# Patient Record
Sex: FEMALE | HISPANIC OR LATINO | Employment: UNEMPLOYED | URBAN - METROPOLITAN AREA
[De-identification: names, ages, dates, MRNs, and addresses within clinical notes are randomized per-mention and may not be internally consistent; named-entity substitution may affect disease eponyms.]

---

## 2017-05-06 ENCOUNTER — HOSPITAL ENCOUNTER (EMERGENCY)
Facility: MEDICAL CENTER | Age: 37
End: 2017-05-06
Attending: EMERGENCY MEDICINE
Payer: OTHER MISCELLANEOUS

## 2017-05-06 VITALS
RESPIRATION RATE: 14 BRPM | SYSTOLIC BLOOD PRESSURE: 111 MMHG | HEART RATE: 78 BPM | WEIGHT: 156.75 LBS | TEMPERATURE: 97.5 F | BODY MASS INDEX: 26.76 KG/M2 | DIASTOLIC BLOOD PRESSURE: 65 MMHG | HEIGHT: 64 IN | OXYGEN SATURATION: 97 %

## 2017-05-06 DIAGNOSIS — B02.9 HERPES ZOSTER WITHOUT COMPLICATION: ICD-10-CM

## 2017-05-06 PROCEDURE — A9270 NON-COVERED ITEM OR SERVICE: HCPCS | Performed by: EMERGENCY MEDICINE

## 2017-05-06 PROCEDURE — 99284 EMERGENCY DEPT VISIT MOD MDM: CPT

## 2017-05-06 PROCEDURE — 700102 HCHG RX REV CODE 250 W/ 637 OVERRIDE(OP): Performed by: EMERGENCY MEDICINE

## 2017-05-06 RX ORDER — VALACYCLOVIR HYDROCHLORIDE 500 MG/1
1000 TABLET, FILM COATED ORAL ONCE
Status: COMPLETED | OUTPATIENT
Start: 2017-05-06 | End: 2017-05-06

## 2017-05-06 RX ORDER — TRAMADOL HYDROCHLORIDE 50 MG/1
50 TABLET ORAL EVERY 4 HOURS PRN
Qty: 30 TAB | Refills: 0 | Status: SHIPPED | OUTPATIENT
Start: 2017-05-06

## 2017-05-06 RX ORDER — VALACYCLOVIR HYDROCHLORIDE 1 G/1
1000 TABLET, FILM COATED ORAL 3 TIMES DAILY
Qty: 21 TAB | Refills: 0 | Status: SHIPPED | OUTPATIENT
Start: 2017-05-06 | End: 2017-05-13

## 2017-05-06 RX ADMIN — VALACYCLOVIR 1000 MG: 500 TABLET, FILM COATED ORAL at 23:07

## 2017-05-06 ASSESSMENT — LIFESTYLE VARIABLES: DO YOU DRINK ALCOHOL: NO

## 2017-05-06 NOTE — ED AVS SNAPSHOT
5/6/2017    Chitra Higgins  [address] [place] [address]    Dear Chitra:    [facility] wants to ensure your discharge home is safe and you or your loved ones have had all of your questions answered regarding your care after you leave the hospital.    Below is a list of resources and contact information should you have any questions regarding your hospital stay, follow-up instructions, or active medical symptoms.    Questions or Concerns Regarding… Contact   Medical Questions Related to Your Discharge  (7 days a week, 8am-5pm) Contact a Nurse Care Coordinator   [phone]   Medical Questions Not Related to Your Discharge  (24 hours a day / 7 days a week)  Contact the Nurse Health Line   [phone]    Medications or Discharge Instructions Refer to your discharge packet   or contact your [facility] Primary Care Provider   [phone]   Follow-up Appointment(s) Schedule your appointment via [facility]   or contact Scheduling [phone]   Billing Review your statement via [facility]  or contact Billing [phone]   Medical Records Review your records via [facility]   or contact Medical Records [phone]     You may receive a telephone call within two days of discharge. This call is to make certain you understand your discharge instructions and have the opportunity to have any questions answered. You can also easily access your medical information, test results and upcoming appointments via the [facility] free online health management tool. You can learn more and sign up at [employer]. For assistance setting up your [facility] account, please call [phone].    Once again, we want to ensure your discharge home is safe and that you have a clear understanding of any next steps in your care. If you have any questions or concerns, please do not hesitate to contact us, we are here for you. Thank you for choosing [facility] for your healthcare needs.    Sincerely,    Your [facility] Healthcare Team

## 2017-05-06 NOTE — ED AVS SNAPSHOT
[facility] Together Access Code: [email or web address]  Expires: 6/5/2017 11:09 PM    Your email address is not on file at [facility].  Email Addresses are required for you to sign up for [facility], please contact [phone] to verify your personal information and to provide your email address prior to attempting to register for [facility].    Chitra Higgins  [address] [place] [address]    [facility] Together  A secure, online tool to manage your health information     [facility]’s [facility] Together® is a secure, online tool that connects you to your personalized health information from the privacy of your home -- day or night - making it very easy for you to manage your healthcare. Once the activation process is completed, you can even access your medical information using the [facility] Together pawan, which is available for free in the Apple Pawan store or Google Play store.     To learn more about [facility] Together, visit [email or web address]    There are two levels of access available (as shown below):   My Chart Features  [facility] Primary Care Doctor [facility]  Specialists [facility]  Urgent  Care Non-[facility] Primary Care Doctor   Email your healthcare team securely and privately 24/7 X X X    Manage appointments: schedule your next appointment; view details of past/upcoming appointments X      Request prescription refills. X      View recent personal medical records, including lab and immunizations X X X X   View health record, including health history, allergies, medications X X X X   Read reports about your outpatient visits, procedures, consult and ER notes X X X X   See your discharge summary, which is a recap of your hospital and/or ER visit that includes your diagnosis, lab results, and care plan X X  X     How to register for [employer]:  Once your e-mail address has been verified, follow the following steps to sign up for [employer].     1. Go to  [email or web address]  2. Click on the Sign Up Now box, which takes you to the New Member Sign Up page. You  will need to provide the following information:  a. Enter your [employer] Access Code exactly as it appears at the top of this page. (You will not need to use this code after you’ve completed the sign-up process. If you do not sign up before the expiration date, you must request a new code.)   b. Enter your date of birth.   c. Enter your home email address.   d. Click Submit, and follow the next screen’s instructions.  3. Create a Qluet ID. This will be your [employer]avegg login ID and cannot be changed, so think of one that is secure and easy to remember.  4. Create a Navegg password. You can change your password at any time.  5. Enter your Password Reset Question and Answer. This can be used at a later time if you forget your password.   6. Enter your e-mail address. This allows you to receive e-mail notifications when new information is available in [employer]gg.  7. Click Sign Up. You can now view your health information.    For assistance activating your [employer] account, call [phone]

## 2017-05-06 NOTE — ED AVS SNAPSHOT
Home Care Instructions                                                                                                                Chitra Higgins   MRN: [id number]    Department:  [facility], Emergency Dept   Date of Visit:  5/6/2017            [facility], Emergency Dept    [address] [place] [id number]    Phone:  [phone]      You were seen by     Dedra Mari M.D.      Your Diagnosis Was     Herpes zoster without complication     B02.9       These are the medications you received during your hospitalization from 05/06/2017 2019 to 05/06/2017 2309     Date/Time Order Dose Route Action    05/06/2017 2307 valacyclovir (VALTREX) caplet 1,000 mg 1,000 mg Oral Given      Follow-up Information     1. Follow up with [facility], Emergency Dept.    Specialty:  Emergency Medicine    Why:  Return for worsening pain, redness, rash or other concerns    Contact information    [address] [place] [address]  [place] [address] [phone]      Medication Information     Review all of your home medications and newly ordered medications with your primary doctor and/or pharmacist as soon as possible. Follow medication instructions as directed by your doctor and/or pharmacist.     Please keep your complete medication list with you and share with your physician. Update the information when medications are discontinued, doses are changed, or new medications (including over-the-counter products) are added; and carry medication information at all times in the event of emergency situations.               Medication List      START taking these medications        Instructions    Morning Afternoon Evening Bedtime    tramadol 50 MG Tabs   Commonly known as:  ULTRAM        Take 1 Tab by mouth every four hours as needed.   Dose:  50 mg                        valacyclovir 1 GM Tabs   Last time this was given:  1,000 mg on 5/6/2017 11:07 PM   Commonly known as:  VALTREX           Take 1 Tab by mouth 3 times a day for 7 days.   Dose:  1000 mg                             Where to Get Your Medications      You can get these medications from any pharmacy     Bring a paper prescription for each of these medications    - tramadol 50 MG Tabs  - valacyclovir 1 GM Tabs              Discharge Instructions       Culebrilla  (Shingles)  La culebrilla es mayank infección que causa mayank erupción dolorosa en la piel y ampollas llenas de líquido. La culebrilla está causada por el mismo virus que causa la varicela.  Solo se manifiesta en personas que:  · Tuvieron varicela.  · Recibieron la vacuna contra la varicela. ([place] es poco frecuente).  Los primeros síntomas de la culebrilla pueden ser picazón, hormigueo o dolor en mayank maria g de la piel. Luego de unos días o semanas aparecerá mayank erupción cutánea. La erupción suele presentarse en un solo lado del cuerpo en un patrón con forma de garth o de toth. Con el tiempo, la erupción se convierte en ampollas llenas de líquido que se abren, claire costras y se secan. Los medicamentos pueden tener los siguientes efectos:  · Ayudar a controlar el dolor.  · Lograr mayank recuperación más rápida.  · Prevenir problemas a ryan plazo.  CUIDADOS EN EL HOGAR  Medicamentos   · [place] los medicamentos solamente char se lo haya indicado el médico.  · Aplique mayank crema para calmar la picazón o con anestesia en la maria g afectada, char se lo haya indicado el médico.  Cuidado de la erupción y las ampollas   · [place] un baño de agua fría o aplique compresas frías en la maria g de la erupción o las ampollas char se lo haya indicado el médico. [place] aliviará el dolor y la picazón.  · Mantenga la maria g de la erupción cubierta con mayank venda (vendaje). Use ropas sueltas.  · Mantenga limpia la maria g de la erupción y las ampollas con jabón suave y agua fría, o char se lo haya indicado el médico.  · Controle la erupción todos los días para detectar signos de infección. Estos signos incluyen  enrojecimiento, hinchazón o dolor que perduran o empeoran.  · No pellizque las ampollas.  · No se rasque la maria g de la erupción.  Instrucciones generales   · Virgen reposo charbel char le indicó el médico.  · Concurra a todas las visitas de control char se lo haya indicado el médico. [place] es importante.  · Hasta tanto las ampollas formen costras, la infección puede causar varicela en las personas que nunca la tuvieron o no se vacunaron contra la varicela. Para impedir que esto sucede, evite tocar a otras personas o estar cerca de otras personas, en especial:  ¨ Bebés.  ¨ Embarazadas.  ¨ Niños que tienen eccema.  ¨ Personas mayores que presley recibido un trasplante.  ¨ Personas con enfermedades crónicas, char leucemia y sida.  SOLICITE AYUDA SI:  · El dolor no mejora con medicamentos.  · El dolor no mejora después de que la erupción desaparece.  · La erupción parece infectada. Los signos de infección son los siguiente:  ¨ Enrojecimiento.  ¨ Hinchazón.  ¨ Dolor que perdura o empeora.  SOLICITE AYUDA DE INMEDIATO SI:  · La erupción aparece en el thelma o la nariz.  · Tiene dolor en el thelma, en la maria g de los ojos o pérdida de la sensibilidad en un lado del thelma.  · Siente dolor o un zumbido en el oído.  · Tiene pérdida del gusto.  · La afección empeora.     Esta información no tiene char fin reemplazar el consejo del médico. Asegúrese de hacerle al médico cualquier pregunta que tenga.     Document Released: 03/16/2010 Document Revised: 01/08/2016  Elsevier Interactive Patient Education ©2016 Elsevier Inc.            Patient Information     Patient Information    Following emergency treatment: all patient requiring follow-up care must return either to a private physician or a clinic if your condition worsens before you are able to obtain further medical attention, please return to the emergency room.     Billing Information    At [facility], we work to make the billing process streamlined for our patients.  Our  Representatives are here to answer any questions you may have regarding your hospital bill.  If you have insurance coverage and have supplied your insurance information to us, we will submit a claim to your insurer on your behalf.  Should you have any questions regarding your bill, we can be reached online or by phone as follows:  Online: You are able pay your bills online or live chat with our representatives about any billing questions you may have. We are here to help Monday - Friday from 8:00am to 7:30pm and 9:00am - 12:00pm on Saturdays.  Please visit [email or web address]  for more information.   Phone:  [phone] or [phone]    Please note that your emergency physician, surgeon, pathologist, radiologist, anesthesiologist, and other specialists are not employed by [facility] and will therefore bill separately for their services.  Please contact them directly for any questions concerning their bills at the numbers below:     Emergency Physician Services:  [phone]  [facility]:  [phone]  [facility]:  [phone]  [facility]:  [phone]    1. Your final bill may vary from the amount quoted upon discharge if all procedures are not complete at that time, or if your doctor has additional procedures of which we are not aware. You will receive an additional bill if you return to the Emergency Department at [facility] for suture removal regardless of the facility of which the sutures were placed.     2. Please arrange for settlement of this account at the emergency registration.    3. All self-pay accounts are due in full at the time of treatment.  If you are unable to meet this obligation then payment is expected within 4-5 days.     4. If you have had radiology studies (CT, X-ray, Ultrasound, MRI), you have received a preliminary result during your emergency department visit. Please contact the radiology  department [phone] to receive a copy of your final result. Please discuss the Final result with your primary physician or with the follow up physician provided.     Crisis Hotline:  [place] Crisis Hotline:  0-625-QIBKMKS or 1-696.527.6925  [place] Crisis Hotline:    1-342.928.9208 or 739-308-2814         ED Discharge Follow Up Questions    1. In order to provide you with very good care, we would like to follow up with a phone call in the next few days.  May we have your permission to contact you?     YES /  NO    2. What is the best phone number to call you? (       )_____-__________    3. What is the best time to call you?      Morning  /  Afternoon  /  Evening                   Patient Signature:  ____________________________________________________________    Date:  ____________________________________________________________

## 2017-05-07 NOTE — ED PROVIDER NOTES
"      ED Provider Note    Scribed for Dedra Mari M.D. by Sandra Don. 5/6/2017, 10:23 PM.    Means of arrival: walk-in  History obtained from: patient  History limited by: none    CHIEF COMPLAINT  Chief Complaint   Patient presents with   • Rash     Since monday. Right lower back and right abd.        HPI  Chitra Higgins is a 36 y.o. female who presents to the Emergency Department for a rash onset 6 days ago along her lower back and abdomen with associated itchiness and pain. Patient had chicken pox as a child. She states that some of her lesions are new today.  No complaints of fever.    REVIEW OF SYSTEMS  Pertinent positives include rash, itchiness, and pain along the rash. Pertinent negatives include no fever.      PAST MEDICAL HISTORY   has a past medical history of History of macrosomia in infant in prior pregnancy, currently pregnant (4/6/2010) and Heart burn.    SOCIAL HISTORY  Social History   Substance Use Topics   • Smoking status: Never Smoker    • Smokeless tobacco: Never Used   • Alcohol Use: No      History   Drug Use No       SURGICAL HISTORY   has past surgical history that includes elizabeth by laparoscopy (8/25/2013) and breast biopsy (10/23/2013).     CURRENT MEDICATIONS  Home Medications     **Home medications have not yet been reviewed for this encounter**          ALLERGIES  Allergies   Allergen Reactions   • Pcn [Penicillins] Rash and Itching       PHYSICAL EXAM  VITAL SIGNS: /71 mmHg  Pulse 101  Temp(Src) 36.4 °C (97.5 °F)  Resp 14  Ht 1.626 m (5' 4\")  Wt 71.1 kg (156 lb 12 oz)  BMI 26.89 kg/m2  SpO2 97%  LMP 05/02/2017    Constitutional: Alert in no apparent distress. Well appearing  HENT: Normocephalic, Atraumatic, Bilateral external ears normal. Nose normal.   Eyes:  Conjunctiva normal, non-icteric.   Lungs: Non-labored respirations  Skin: Warm, Dry, clumps of erythematous vesicular legions and dermatome disposition around right flank.  Neurologic: Alert, Grossly " non-focal.   Psychiatric: Affect normal, Judgment normal, Mood normal, Appears appropriate and not intoxicated.         COURSE & MEDICAL DECISION MAKING  Pertinent Labs & Imaging studies reviewed. (See chart for details)    10:23 PM - Patient seen and examined at bedside. I informed the patient that she is experiencing Shingles, which is a virus that will have to run its course. However given new lesions she'll be prescribed Valtrex. I advised Benadryl to help with the discomfort. She will also get some pain medication as this could be very painful.       The patient will return for new or worsening symptoms and is stable at the time of discharge. Patient was given return precautions. Patient and/or family member verbalizes understanding and will comply.    The patient will not drink alcohol nor drive with prescribed medications. The patient will return for new or worsening symptoms and is stable at the time of discharge. Patient was given return precautions. Patient and/or family member verbalizes understanding and will comply.    I have queried the patient in the prescription monitoring program, I do not see any evidence of prescription abuse.      DISPOSITION:  Patient will be discharged home in stable condition.    FOLLOW UP:  [facility], Emergency Dept  [facility] [facility] [address] [phone]    Return for worsening pain, redness, rash or other concerns      OUTPATIENT MEDICATIONS:  Discharge Medication List as of 5/6/2017 11:09 PM      START taking these medications    Details   valacyclovir (VALTREX) 1 GM Tab Take 1 Tab by mouth 3 times a day for 7 days., Disp-21 Tab, R-0, Print Rx Paper      tramadol (ULTRAM) 50 MG Tab Take 1 Tab by mouth every four hours as needed., Disp-30 Tab, R-0, Print Rx Paper               FINAL IMPRESSION  1. Herpes zoster without complication         This dictation has been created using voice recognition software and/or scribes. The accuracy of  the dictation is limited by the abilities of the software and the expertise of the scribes. I expect there may be some errors of grammar and possibly content. I made every attempt to manually correct the errors within my dictation. However, errors related to voice recognition software and/or scribes may still exist and should be interpreted within the appropriate context.     ISandra (Scribe), am scribing for, and in the presence of, Dedra Mari M.D..    Electronically signed by: Sandra Don (Scribe), 5/6/2017    IDedra M.D. personally performed the services described in this documentation, as scribed by Sandra Don in my presence, and it is both accurate and complete.    The note accurately reflects work and decisions made by me.  Dedra Mari  5/7/2017  1:43 AM

## 2017-05-07 NOTE — ED NOTES
Chitra Higgins  36 y.o. female  Chief Complaint   Patient presents with   • Rash     Since monday. Right lower back and right abd.      Pt amb to triage with steady gait for above complaint. Rash appears to follow a dermatome.   Pt placed in lobby. Pt educated on triage process. Pt encouraged to alert staff for any changes.

## 2017-05-07 NOTE — ED NOTES
Pt given discharge paperwork. 2 new scripts given to pt. Discussed with patient and all questions answered. Pt to lobby with all personal belongings.

## 2017-05-07 NOTE — DISCHARGE INSTRUCTIONS
Culebrilla  (Shingles)  La culebrilla es mayank infección que causa mayank erupción dolorosa en la piel y ampollas llenas de líquido. La culebrilla está causada por el mismo virus que causa la varicela.  Solo se manifiesta en personas que:  · Tuvieron varicela.  · Recibieron la vacuna contra la varicela. ([place] es poco frecuente).  Los primeros síntomas de la culebrilla pueden ser picazón, hormigueo o dolor en mayank maria g de la piel. Luego de unos días o semanas aparecerá mayank erupción cutánea. La erupción suele presentarse en un solo lado del cuerpo en un patrón con forma de garth o de toth. Con el tiempo, la erupción se convierte en ampollas llenas de líquido que se abren, claire costras y se secan. Los medicamentos pueden tener los siguientes efectos:  · Ayudar a controlar el dolor.  · Lograr mayank recuperación más rápida.  · Prevenir problemas a ryan plazo.  CUIDADOS EN EL HOGAR  Medicamentos   · [place] los medicamentos solamente char se lo haya indicado el médico.  · Aplique mayank crema para calmar la picazón o con anestesia en la maria g afectada, char se lo haya indicado el médico.  Cuidado de la erupción y las ampollas   · [place] un baño de agua fría o aplique compresas frías en la maria g de la erupción o las ampollas char se lo haya indicado el médico. [place] aliviará el dolor y la picazón.  · Mantenga la maria g de la erupción cubierta con mayank venda (vendaje). Use ropas sueltas.  · Mantenga limpia la maria g de la erupción y las ampollas con jabón suave y agua fría, o char se lo haya indicado el médico.  · Controle la erupción todos los días para detectar signos de infección. Estos signos incluyen enrojecimiento, hinchazón o dolor que perduran o empeoran.  · No pellizque las ampollas.  · No se rasque la maria g de la erupción.  Instrucciones generales   · Virgen reposo charble char le indicó el médico.  · Concurra a todas las visitas de control char se lo haya indicado el médico. [place] es importante.  · Hasta tanto las ampollas formen costras,  la infección puede causar varicela en las personas que nunca la tuvieron o no se vacunaron contra la varicela. Para impedir que esto sucede, evite tocar a otras personas o estar cerca de otras personas, en especial:  ¨ Bebés.  ¨ Embarazadas.  ¨ Niños que tienen eccema.  ¨ Personas mayores que presley recibido un trasplante.  ¨ Personas con enfermedades crónicas, char leucemia y sida.  SOLICITE AYUDA SI:  · El dolor no mejora con medicamentos.  · El dolor no mejora después de que la erupción desaparece.  · La erupción parece infectada. Los signos de infección son los siguiente:  ¨ Enrojecimiento.  ¨ Hinchazón.  ¨ Dolor que perdura o empeora.  SOLICITE AYUDA DE INMEDIATO SI:  · La erupción aparece en el thelma o la nariz.  · Tiene dolor en el thelma, en la maria g de los ojos o pérdida de la sensibilidad en un lado del thelma.  · Siente dolor o un zumbido en el oído.  · Tiene pérdida del gusto.  · La afección empeora.     Esta información no tiene char fin reemplazar el consejo del médico. Asegúrese de hacerle al médico cualquier pregunta que tenga.     Document Released: 03/16/2010 Document Revised: 01/08/2016  [employer] Interactive Patient Education ©2016 Elsevier Inc.

## 2017-05-14 ENCOUNTER — PATIENT OUTREACH (OUTPATIENT)
Dept: HEALTH INFORMATION MANAGEMENT | Facility: OTHER | Age: 37
End: 2017-05-14

## 2017-05-14 NOTE — PROGRESS NOTES
· 5/14/17 at 9:50 AM--Received phone call from patient s/p ER discharge on 5/6/17.  Pt is Citizen of [place]-speaking only, spoke with pt through  at  line.  Pt states she was pt at ER on 5/6/17 and diagnosed with shingles.  Patient states she has completed prescribed medication and she still has rash on back and lower abdomen, along with itchiness and pain.  Patient states she does not have a regular PCP.  Instructed patient to return to ER for worsening symptoms or contact [facility] clinic tomorrow 5/15/17 for f/u appt.  Pt verbalizes understanding and states that she is familiar with the location of the [facility] clinic and has their contact phone number.  Pt states that she will contact the [facility] clinic tomorrow 5/15/17 for f/u appt.

## 2018-04-14 ENCOUNTER — HOSPITAL ENCOUNTER (EMERGENCY)
Facility: MEDICAL CENTER | Age: 38
End: 2018-04-14
Attending: EMERGENCY MEDICINE

## 2018-04-14 VITALS
HEIGHT: 65 IN | OXYGEN SATURATION: 97 % | TEMPERATURE: 97.8 F | BODY MASS INDEX: 26.63 KG/M2 | RESPIRATION RATE: 16 BRPM | DIASTOLIC BLOOD PRESSURE: 74 MMHG | WEIGHT: 159.83 LBS | HEART RATE: 73 BPM | SYSTOLIC BLOOD PRESSURE: 108 MMHG

## 2018-04-14 DIAGNOSIS — N30.90 CYSTITIS: ICD-10-CM

## 2018-04-14 LAB
APPEARANCE UR: ABNORMAL
COLOR UR AUTO: YELLOW
GLUCOSE UR QL STRIP.AUTO: NEGATIVE MG/DL
HCG UR QL: NEGATIVE
KETONES UR QL STRIP.AUTO: NEGATIVE MG/DL
LEUKOCYTE ESTERASE UR QL STRIP.AUTO: ABNORMAL
NITRITE UR QL STRIP.AUTO: NEGATIVE
PH UR STRIP.AUTO: 5.5 [PH]
PROT UR QL STRIP: 100 MG/DL
RBC UR QL AUTO: ABNORMAL
SP GR UR: 1.02

## 2018-04-14 PROCEDURE — 99283 EMERGENCY DEPT VISIT LOW MDM: CPT

## 2018-04-14 PROCEDURE — 81002 URINALYSIS NONAUTO W/O SCOPE: CPT

## 2018-04-14 PROCEDURE — 81025 URINE PREGNANCY TEST: CPT

## 2018-04-14 RX ORDER — NITROFURANTOIN 25; 75 MG/1; MG/1
100 CAPSULE ORAL 2 TIMES DAILY
Qty: 14 CAP | Refills: 0 | Status: SHIPPED | OUTPATIENT
Start: 2018-04-14 | End: 2018-04-21

## 2018-04-14 ASSESSMENT — LIFESTYLE VARIABLES: DO YOU DRINK ALCOHOL: NO

## 2018-04-14 ASSESSMENT — PAIN SCALES - GENERAL: PAINLEVEL_OUTOF10: 2

## 2018-04-14 NOTE — ED NOTES
Pt ambulated to yellow 63.  Agree with triage note.  Urine sample collected and POC in progress.  ERP to see.

## 2018-04-14 NOTE — ED PROVIDER NOTES
"ED Provider Note    CHIEF COMPLAINT  Chief Complaint   Patient presents with   • Painful Urination     Pt states since yesterday having painful urination and increased frequency.        HPI  Chitra Higgins is a 37 y.o. female who presents to the emergency Department dysuria. Started yesterday. Moderate genital Burning sensation during straining. No pain otherwise. Denies abdominal pain, nausea or vomiting. No flank pain. No fevers or chills. No vaginal discharge or abnormal bleeding. Not pregnant.    REVIEW OF SYSTEMS  Pertinent negative: As above    PAST MEDICAL HISTORY  Past Medical History:   Diagnosis Date   • Heart burn    • History of macrosomia in infant in prior pregnancy, currently pregnant 4/6/2010       SOCIAL HISTORY  Social History   Substance Use Topics   • Smoking status: Never Smoker   • Smokeless tobacco: Never Used   • Alcohol use No       SURGICAL HISTORY  Past Surgical History:   Procedure Laterality Date   • BREAST BIOPSY  10/23/2013    Performed by Franki Dodd M.D. at [facility]   • KATHLEEN BY LAPAROSCOPY  8/25/2013    Performed by Franki Dodd M.D. at [facility]       ALLERGIES  Allergies   Allergen Reactions   • Pcn [Penicillins] Rash and Itching       PHYSICAL EXAM  VITAL SIGNS: /73   Pulse 75   Temp 36.6 °C (97.8 °F)   Resp 18   Ht 1.651 m (5' 5\")   Wt 72.5 kg (159 lb 13.3 oz)   LMP 10/14/2009   SpO2 98%   BMI 26.60 kg/m²    Constitutional: Awake and alert. Nontoxic  HENT:  Grossly normal  Eyes: Grossly normal  Neck: Normal range of motion  Cardiovascular: Normal heart rate   Thorax & Lungs: No respiratory distress  Extremities: Well perfused  Psychiatric: Affect normal  Results for orders placed or performed during the hospital encounter of 04/14/18   POC UA   Result Value Ref Range    POC Color Yellow     POC Appearance Slightly Cloudy (A)     POC Glucose Negative Negative mg/dL    POC Ketones Negative Negative mg/dL    POC " Specific Gravity 1.025 1.005 - 1.030    POC Blood Large (A) Negative    POC Urine PH 5.5 5.0 - 8.0    POC Protein 100 (A) Negative mg/dL    POC Nitrites Negative Negative    POC Leukocyte Esterase Moderate (A) Negative   POC URINE PREGNANCY   Result Value Ref Range    POC Urine Pregnancy Test Negative Negative         COURSE & MEDICAL DECISION MAKING  Patient presents with dysuria. Obtained urinalysis which shows evidence of UTI. Patient does not have suggestion of upper tract infection. She will be treated with Macrobid. Advised follow-up with primary doctor in 1 week. Return to ER for worsening and not improving or concern.    Patient referred to primary provider for blood pressure management    FINAL IMPRESSION  1. Cystitis with hematuria      Disposition: home in good condition      This dictation was created using voice recognition software. The accuracy of the dictation is limited to the abilities of the software.  The nursing notes were reviewed and certain aspects of this information were incorporated into this note.      Electronically signed by: Ben Bowling, 4/14/2018 9:52 AM

## 2018-04-14 NOTE — ED NOTES
Discharge instructions given.  All questions answered.  Prescriptions given x1.  Pt to follow-up with PCP.  Pt verbalized understanding.  All belongings with pt.  Pt ambulated to lobby.

## 2018-04-14 NOTE — ED TRIAGE NOTES
"Chief Complaint   Patient presents with   • Painful Urination     Pt states since yesterday having painful urination and increased frequency.      Pt provided with urine specimen cup.   /73   Pulse 75   Temp 36.6 °C (97.8 °F)   Resp 18   Ht 1.651 m (5' 5\")   Wt 72.5 kg (159 lb 13.3 oz)   LMP 10/14/2009   SpO2 98%   BMI 26.60 kg/m²   Pt placed back in lobby, educated on triage process, and told to inform staff of any change in condition.     "

## 2018-05-15 ENCOUNTER — HOSPITAL ENCOUNTER (EMERGENCY)
Facility: MEDICAL CENTER | Age: 38
End: 2018-05-16
Attending: EMERGENCY MEDICINE

## 2018-05-15 DIAGNOSIS — N39.0 ACUTE UTI: ICD-10-CM

## 2018-05-15 LAB
APPEARANCE UR: CLEAR
BACTERIA #/AREA URNS HPF: NEGATIVE /HPF
BILIRUB UR QL STRIP.AUTO: ABNORMAL
COLOR UR: ABNORMAL
EPI CELLS #/AREA URNS HPF: ABNORMAL /HPF
GLUCOSE UR STRIP.AUTO-MCNC: NEGATIVE MG/DL
HCG UR QL: NEGATIVE
HYALINE CASTS #/AREA URNS LPF: ABNORMAL /LPF
KETONES UR STRIP.AUTO-MCNC: NEGATIVE MG/DL
LEUKOCYTE ESTERASE UR QL STRIP.AUTO: ABNORMAL
MICRO URNS: ABNORMAL
NITRITE UR QL STRIP.AUTO: POSITIVE
PH UR STRIP.AUTO: 5.5 [PH]
PROT UR QL STRIP: NEGATIVE MG/DL
RBC # URNS HPF: ABNORMAL /HPF
RBC UR QL AUTO: ABNORMAL
SP GR UR REFRACTOMETRY: 1.03
SP GR UR STRIP.AUTO: 1.03
UROBILINOGEN UR STRIP.AUTO-MCNC: 1 MG/DL
WBC #/AREA URNS HPF: ABNORMAL /HPF

## 2018-05-15 PROCEDURE — 700102 HCHG RX REV CODE 250 W/ 637 OVERRIDE(OP): Performed by: EMERGENCY MEDICINE

## 2018-05-15 PROCEDURE — 99284 EMERGENCY DEPT VISIT MOD MDM: CPT

## 2018-05-15 PROCEDURE — 81001 URINALYSIS AUTO W/SCOPE: CPT

## 2018-05-15 PROCEDURE — 81025 URINE PREGNANCY TEST: CPT

## 2018-05-15 PROCEDURE — 87086 URINE CULTURE/COLONY COUNT: CPT

## 2018-05-15 PROCEDURE — A9270 NON-COVERED ITEM OR SERVICE: HCPCS | Performed by: EMERGENCY MEDICINE

## 2018-05-15 RX ORDER — ONDANSETRON 4 MG/1
4 TABLET, ORALLY DISINTEGRATING ORAL EVERY 8 HOURS PRN
Qty: 10 TAB | Refills: 0 | Status: SHIPPED | OUTPATIENT
Start: 2018-05-15

## 2018-05-15 RX ORDER — PHENAZOPYRIDINE HYDROCHLORIDE 200 MG/1
100 TABLET, FILM COATED ORAL 3 TIMES DAILY PRN
Qty: 6 TAB | Refills: 0 | Status: SHIPPED | OUTPATIENT
Start: 2018-05-15

## 2018-05-15 RX ORDER — NITROFURANTOIN 25; 75 MG/1; MG/1
100 CAPSULE ORAL 2 TIMES DAILY
Qty: 14 CAP | Refills: 0 | Status: SHIPPED | OUTPATIENT
Start: 2018-05-15

## 2018-05-15 RX ORDER — NITROFURANTOIN 25; 75 MG/1; MG/1
100 CAPSULE ORAL ONCE
Status: COMPLETED | OUTPATIENT
Start: 2018-05-15 | End: 2018-05-15

## 2018-05-15 RX ADMIN — NITROFURANTOIN (MONOHYDRATE/MACROCRYSTALS) 100 MG: 75; 25 CAPSULE ORAL at 23:41

## 2018-05-15 ASSESSMENT — PAIN SCALES - GENERAL
PAINLEVEL_OUTOF10: 8
PAINLEVEL_OUTOF10: 7

## 2018-05-16 VITALS
BODY MASS INDEX: 29.34 KG/M2 | HEIGHT: 63 IN | SYSTOLIC BLOOD PRESSURE: 131 MMHG | WEIGHT: 165.57 LBS | RESPIRATION RATE: 18 BRPM | OXYGEN SATURATION: 99 % | DIASTOLIC BLOOD PRESSURE: 76 MMHG | HEART RATE: 69 BPM | TEMPERATURE: 96.9 F

## 2018-05-16 NOTE — ED TRIAGE NOTES
"Chitra Wallerjamila Higgins  Chief Complaint   Patient presents with   • Painful Urination     since yesterday.    • Low Back Pain     Pt ambulatory to triage with above complaint. Pt states s/s started yesterday. Pt states she has been taking OTC Azo for urinary pain relief with no relief of s/s. Pt also report lower back pain that started today. Pt states she was seen 1 month ago here for UTI.    /64   Pulse 74   Temp 36.1 °C (96.9 °F) (Temporal)   Resp 16   Ht 1.6 m (5' 3\")   Wt 75.1 kg (165 lb 9.1 oz)   LMP 10/14/2009   SpO2 97%   BMI 29.33 kg/m²     Pt informed of triage process and encouraged to notify staff of any changes or concerns. Pt verbalized understanding of instructions. Apologized for long wait time. Pt placed back in lobby.     "

## 2018-05-16 NOTE — ED NOTES
Painful Urination        since yesterday.    • Low Back Pain      Pt ambulatory to triage with above complaint. Pt states s/s started yesterday. Pt states she has been taking OTC Azo for urinary pain relief with no relief of s/s. Pt also report lower back pain that started today. Pt states she was seen 1 month ago here for UTI.

## 2018-05-16 NOTE — DISCHARGE INSTRUCTIONS
Return to ER for   Follow up with urologist for frequent urine infections  Take Macrobid as directed twice daily for one week  Take Zofran as needed for nausea  Take pyridium as needed for painful urination  Obtain a regular doctor for recheck of urine after antibiotics are finished        Infección de las vías urinarias en los adultos  (Urinary Tract Infection, Adult)  Mayank infección urinaria (IU) es mayank infección en cualquier parte de las vías urinarias, que incluyen los riñones, los uréteres, la vejiga y la uretra. Estos órganos fabrican, almacenan y eliminan la orina del organismo. La IU puede ser mayank infección de la vejiga (cistitis) o infección de los riñones (pielonefritis).  CAUSAS  Esta infección puede ser causada por hongos, virus o bacterias. Las bacterias son las causas más comunes de las IU. Esta afección también puede ser provocada por no vaciar la vejiga por completo monica la micción en repetidas ocasiones.  FACTORES DE RIESGO  Es más probable que esta afección se manifieste si:  · Usted ignora la necesidad de orinar o retiene la orina monica largos períodos.  · No vacía la vejiga completamente monica la micción.  · Se limpia de atrás hacia adelante después de orinar o defecar, en el david de que sea raisa.  · Está circuncidado, en el david de que sea varón.  · Tiene estreñimiento.  · Tiene colocada mayank sonda urinaria permanente.  · Tiene debilitado el sistema de defensa (inmunitario) del cuerpo.  · Tiene mayank enfermedad que afecta los intestinos, los riñones o la vejiga.  · Tiene diabetes.  · Charito antibióticos con frecuencia o monica largos períodos, y los antibióticos ya no resultan eficaces para combatir algunos tipos de infecciones (resistencia a los antibióticos).  · Charito medicamentos que le irritan las vías urinarias.  · Está expuesto a sustancias químicas que le irritan las vías urinarias.  · Es raisa.  SÍNTOMAS  Los síntomas de esta afección incluyen lo siguiente:  · Fiebre.  · Micción  frecuente o eliminación de pequeñas cantidades de orina con frecuencia.  · Necesidad urgente de orinar.  · Ardor o dolor al orinar.  · Orina con mal olor u olor atípico.  · Orina turbia.  · Dolor en la parte baja del abdomen o en la espalda.  · Dificultad para orinar.  · Deni en la orina.  · Vómitos o más apetito de lo normal.  · Diarrea o dolor abdominal.  · Secreción vaginal, si es raisa.  DIAGNÓSTICO  Esta afección se diagnostica mediante peyton antecedentes médicos y un examen físico. También deberá proporcionar mayank muestra de orina para realizar análisis. Podrán indicarle otros estudios, por ejemplo:  · Análisis de deni.  · Pruebas de detección de enfermedades de transmisión sexual (ETS).  Si ha tenido más de mayank IU, se pueden hacer estudios de diagnóstico por imágenes o mayank citoscopia para determinar la causa de las infecciones.  TRATAMIENTO  El tratamiento de esta afección suele incluir mayank combinación de dos o más de los siguientes:  · Antibióticos.  · Otros medicamentos para tratar las causas menos frecuentes de infección urinaria.  · Medicamentos de venta león para aliviar el dolor.  · Consumo de la cantidad necesaria de agua para mantenerse hidratado.  INSTRUCCIONES PARA EL CUIDADO EN EL HOGAR  · [place] los medicamentos de venta león y los recetados solamente char se lo haya indicado el médico.  · Si le recetaron un antibiótico, tómelo char se lo haya indicado el médico. No deje de scot los antibióticos aunque comience a sentirse mejor.  · Evite el alcohol, la cafeína, el té y las bebidas gaseosas. Estas sustancias pueden irritar la vejiga.  · Charis suficiente líquido para mantener la orina krishan o de color amarillo pálido.  · Concurra a todas las visitas de control char se lo haya indicado el médico. [place] es importante.  · Asegúrese de lo siguiente:  ¨ Vaciar la vejiga con frecuencia y en alas totalidad. No contener la orina monica largos períodos.  ¨ Vaciar la vejiga antes y después de tener relaciones  sexuales.  ¨ Limpiar de adelante hacia atrás después de defecar, si es raisa. Usar cada trozo de papel mayank vez cuando se limpie.  SOLICITE ATENCIÓN MÉDICA SI:  · Siente dolor en la espalda.  · Tiene fiebre.  · Siente náuseas o vomita.  · Los síntomas no mejoran después de 3 días de tratamiento.  · Los síntomas desaparecen y luego reaparecen.  SOLICITE ATENCIÓN MÉDICA DE INMEDIATO SI:  · Siente dolor intenso en la espalda o en la maria g inferior del abdomen.  · Tiene vómitos y no puede tragar medicamentos ni agua.  Esta información no tiene char fin reemplazar el consejo del médico. Asegúrese de hacerle al médico cualquier pregunta que tenga.  Document Released: 09/27/2006 Document Revised: 04/10/2017 Document Reviewed: 11/07/2016  [employer] Interactive Patient Education © 2017 [employer] Inc[employer]

## 2018-05-16 NOTE — ED PROVIDER NOTES
"ED Provider Note    CHIEF COMPLAINT  Chief Complaint   Patient presents with   • Painful Urination     since yesterday.    • Low Back Pain       HPI  Chitra Higgins is a 37 y.o. female with recent and recurrent UTI who presents complaining of dysuria since yesterday.    Patient states she had similar symptoms last month.  She completed her antibiotics and was not rechecked on her urine to ensure clearance.  She was told by her clinic doctor prior to coming to the ER last time that she would need a urology referral as she has had recurrent UTIs for several years at this point.    Patient denies nausea, vomiting, flank pain, fever, chills, vaginal discharge      ALLERGIES  Allergies   Allergen Reactions   • Pcn [Penicillins] Rash and Itching       CURRENT MEDICATIONS  Denies    PAST MEDICAL HISTORY   has a past medical history of Heart burn and History of macrosomia in infant in prior pregnancy, currently pregnant (4/6/2010).    SURGICAL HISTORY   has a past surgical history that includes elizabeth by laparoscopy (8/25/2013) and breast biopsy (10/23/2013).    SOCIAL HISTORY  Social History     Social History Main Topics   • Smoking status: Never Smoker   • Smokeless tobacco: Never Used   • Alcohol use No   • Drug use: No   • Sexual activity: Not on file       Family Hx:  No family history of kidney stones      REVIEW OF SYSTEMS  See HPI for further details.  All other systems are negative except as above in HPI.      PHYSICAL EXAM  VITAL SIGNS: /64   Pulse 74   Temp 36.1 °C (96.9 °F) (Temporal)   Resp 16   Ht 1.6 m (5' 3\")   Wt 75.1 kg (165 lb 9.1 oz)   LMP 10/14/2009   SpO2 97%   BMI 29.33 kg/m²     General:  WDWN, nontoxic appearing in NAD; A+Ox3; V/S as above; afebrile  Skin: warm and dry; good color; no rash  HEENT: NCAT; EOMs intact; PERRL; no scleral icterus   Neck: FROM; soft, supple  Cardiovascular: Regular heart rate and rhythm.  No murmurs, rubs, or gallops; pulses 2+ bilaterally radially "   Lungs: Clear to auscultation with good air movement bilaterally.  No wheezes, rhonchi, or rales.   Abdomen: BS present; soft; NTND; no rebound, guarding, or rigidity.  No organomegaly or pulsatile mass; no CVAT   Extremities: DRUMMOND x 4; no e/o trauma; no pedal edema; neg Philip's  Neurologic: CNs III-XII grossly intact; speech clear; distal sensation intact; strength 5/5 UE/LEs  Psychiatric: Appropriate affect, normal mood    LABS  Results for orders placed or performed during the hospital encounter of 05/15/18   URINALYSIS CULTURE, IF INDICATED   Result Value Ref Range    Color DK Yellow     Character Clear     Specific Gravity 1.029 <1.035    Ph 5.5 5.0 - 8.0    Glucose Negative Negative mg/dL    Ketones Negative Negative mg/dL    Protein Negative Negative mg/dL    Bilirubin Small (A) Negative    Urobilinogen, Urine 1.0 Negative    Nitrite Positive (A) Negative    Leukocyte Esterase Small (A) Negative    Occult Blood Trace (A) Negative    Micro Urine Req Microscopic    URINE MICROSCOPIC (W/UA)   Result Value Ref Range    WBC 20-50 (A) /hpf    RBC 5-10 (A) /hpf    Bacteria Negative None /hpf    Epithelial Cells Few /hpf    Hyaline Cast 3-5 (A) /lpf       MEDICAL RECORD  I have reviewed patient's medical record and pertinent results are listed below.      COURSE & MEDICAL DECISION MAKING  I have reviewed any medical record information, laboratory studies and radiographic results as noted.    Chitra Higgins is a 37 y.o. female who presents complaining of dysuria.  Patient has nitrate positive urine with pyuria.  Urine culture is pending.  I will prescribe Macrobid and Pyridium as well as Zofran.  Patient was advised to have her urine rechecked by a primary care provider.  I have given her a referral to urology as well as [facility], and the on-call family medicine doctor.  HCG was negative.  Patient was advised to urinate after sexual intercourse, urinate frequently, not to hold her urine, and drink  plenty of fluids.  She should also return to the ER for fever, nausea, vomiting, flank pain, or other concerns.  This was all communicated to her in [place] by me.  She expressed understanding.    FINAL IMPRESSION  1. Acute UTI             Electronically signed by: Migdalia Young, 5/15/2018 11:27 PM

## 2018-05-18 LAB
BACTERIA UR CULT: NORMAL
SIGNIFICANT IND 70042: NORMAL
SITE SITE: NORMAL
SOURCE SOURCE: NORMAL

## 2024-07-25 ENCOUNTER — APPOINTMENT (OUTPATIENT)
Dept: LAB | Facility: MEDICAL CENTER | Age: 44
End: 2024-07-25
Payer: MEDICAID

## 2025-05-22 ENCOUNTER — APPOINTMENT (OUTPATIENT)
Dept: RADIOLOGY | Facility: IMAGING CENTER | Age: 45
End: 2025-05-22

## 2025-05-22 ENCOUNTER — OCCUPATIONAL MEDICINE (OUTPATIENT)
Dept: URGENT CARE | Facility: PHYSICIAN GROUP | Age: 45
End: 2025-05-22
Payer: OTHER MISCELLANEOUS

## 2025-05-22 VITALS
HEIGHT: 64 IN | OXYGEN SATURATION: 97 % | BODY MASS INDEX: 29.71 KG/M2 | TEMPERATURE: 97.6 F | DIASTOLIC BLOOD PRESSURE: 70 MMHG | HEART RATE: 75 BPM | RESPIRATION RATE: 20 BRPM | WEIGHT: 174 LBS | SYSTOLIC BLOOD PRESSURE: 116 MMHG

## 2025-05-22 DIAGNOSIS — S59.901A INJURY OF RIGHT ELBOW, INITIAL ENCOUNTER: ICD-10-CM

## 2025-05-22 DIAGNOSIS — S69.91XA INJURY OF RIGHT THUMB, INITIAL ENCOUNTER: ICD-10-CM
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
## 2025-05-22 DIAGNOSIS — S46.911A: ICD-10-CM

## 2025-05-22 DIAGNOSIS — S69.91XA INJURY OF RIGHT THUMB, INITIAL ENCOUNTER: Primary | ICD-10-CM

## 2025-05-22 PROCEDURE — 3074F SYST BP LT 130 MM HG: CPT

## 2025-05-22 PROCEDURE — 73140 X-RAY EXAM OF FINGER(S): CPT | Mod: TC,RT | Performed by: RADIOLOGY

## 2025-05-22 PROCEDURE — 73080 X-RAY EXAM OF ELBOW: CPT | Mod: TC,RT | Performed by: RADIOLOGY

## 2025-05-22 PROCEDURE — 3078F DIAST BP <80 MM HG: CPT

## 2025-05-22 PROCEDURE — 99204 OFFICE O/P NEW MOD 45 MIN: CPT

## 2025-05-22 RX ORDER — IBUPROFEN 200 MG
200 TABLET ORAL EVERY 6 HOURS PRN
COMMUNITY

## 2025-05-22 NOTE — LETTER
PHYSICIAN’S AND CHIROPRACTIC PHYSICIAN'S   PROGRESS REPORT   CERTIFICATION OF DISABILITY Claim Number:     Social Security Number:    Patient’s Name: Chitra Higgins Date of Injury: 5/13/2025   Employer:  [employer] Name of MCO (if applicable):      Patient’s Job Description/Occupation: [employer]       Previous Injuries/Diseases/Surgeries Contributing to the Condition:   None       Diagnosis: (S69.91XA) Injury of right thumb, initial encounter  (primary encounter diagnosis)  (S59.901A) Injury of right elbow, initial encounter  (M77.9) Tendinitis  (Y99.0) Work related injury      Related to the Industrial Injury?   Yes     Explain:  Patient was hit by a swinging door while at work, followed by immediate pain in right thumb and right elbow       Objective Medical Findings:  Mild swelling to right thumb. Passive ROM intact.Flexion of thumb with guarding related to pain. No subungual hematoma. Right wrist and finger extension against resistance intact. Motor, sensory, 2 point discrimination intact distal to injury. No signs of compartment syndrome or abnormality in blood flow. Positive tenderness to palpation of right elbow with associated mild swelling and ecchymosis. Right elbow ROM intact.          None - Discharged                       X  Stable                    Ratable           Generally Improved                         Condition Worsened                  Condition Same  May Have Suffered a Permanent Disability  No      Treatment Plan:     Medrol dosepak  Wrist splint  Work restrictions  FU 7 days          No Change in Therapy                  PT/OT Prescribed                      Medication May be Used While Working        Case Management                          PT/OT Discontinued    Consultation    Further Diagnostic Studies:    Prescription(s)       Dx-Elbow-Right   DX-Fingers-Right       Medrol dosepak      Released to FULL DUTY /No Restrictions on (Date):       Certified TOTALLY  TEMPORARILY DISABLED (Indicate Dates) From:   To:      Released to RESTRICTED/Modified Duty on (Date): From:  5/22/25  To:  5/29/25.   Restrictions Are:   Temporary       No Sitting    No Standing   X No Pulling Other:  No use of right hand/arm        No Bending at Waist     No Stooping   X  No Lifting      X  No Carrying     No Walking Lifting Restricted to (lbs.):         X No Pushing       X No Climbing    X No Reaching Above Shoulders       Date of Next Visit:   5/29/25.  Date of this Exam: 5/22/2025 Physician/Chiropractic Physician Name: SLADE Levine Physician/Chiropractic Physician Signature:  Misbah Brito DO MPH     [place]:  [address] [place] [address], [address] [place], [place] [address] - Telephone [phone] [place]:  [address]  [facility] [address] [facility], [address] [place], [place] [address] - Telephone [phone]    [email or web address]  D-39 (Rev. 10/24)

## 2025-05-22 NOTE — LETTER
"  EMPLOYEE’S CLAIM FOR COMPENSATION/ REPORT OF INITIAL TREATMENT  FORM C-4  PLEASE TYPE OR PRINT    EMPLOYEE’S CLAIM - PROVIDE ALL INFORMATION REQUESTED   First Name                    JESSE Buenrostro                  Last Name  Magaly Higgins Birthdate                    1980                Sex  [x]Female Claim Number (Insurer’s Use Only)     Mailing Address  [address] Age  44 y.o. Height  1.625 m (5' 3.98\") Weight  78.9 kg (174 lb) Social Security Number     [facility] Zip  [address] Telephone  [phone] (home)    Email  No e-mail address on record    Primary Language Spoken  Moroccan    INSURER   THIRD-PARTY   [employer] Workers [employer]   Employee's Occupation (Job Title) When Injury or Occupational Disease Occurred  [employer]    Employer's Name/Company Name    [employer] Telephone   [phone]   Office Mail Address (Number and Street)  [address]     Date of Injury (if applicable) 5/13/2025               Hours Injury (if applicable)  10:30 AM am    pm Date Employer Notified  5/21/2025 Last Day of Work after Injury or Occupational Disease  5/22/2025 Supervisor to Whom Injury Reported  [facility]   Address or Location of Accident (if applicable)  Work [1]   What were you doing at the time of accident? (if applicable)  Entrando para la cosina    How did this injury or occupational disease occur? (Be specific and answer in detail. Use additional sheet if necessary)  El senor fue adejar mercancia y ya abia recibido alas cheque ibamos para la cosino y El paso elaine y las puertitas se abrieron El nose fijo que yo Estire me mano y pegolpeo medijo estas vitaly   If you believe that you have an occupational disease, when did you first have knowledge of the disability and its relationship to your employment?   Witnesses to the Accident (if applicable)  El senor,yo,mayte neves y Nissa    "   Nature of Injury or Occupational Disease  Strain  Part(s) of Body Injured or Affected  Hand (R) Lower Arm (R) Shoulder (R)    I CERTIFY THAT THE ABOVE IS TRUE AND CORRECT TO T HE BEST OF MY KNOWLEDGE AND THAT I HAVE PROVIDED THIS INFORMATION IN ORDER TO OBTAIN THE BENEFITS OF [place]CC[place], OR CHAPTER [place]).  I HEREBY AUTHORIZE ANY PHYSICIAN, CHIROPRACTOR, SURGEON, PRACTITIONER OR ANY OTHER PERSON, ANY HOSPITAL, INCLUDING [facility] OR [facility], ANY  MEDICAL SERVICE ORGANIZATION, ANY INSURANCE COMPANY, OR OTHER INSTITUTION OR ORGANIZATION TO RELEASE TO EACH OTHER, ANY MEDICAL OR OTHER INFORMATION, INCLUDING BENEFITS PAID OR PAYABLE, PERTINENT TO THIS INJURY OR DISEASE, EXCEPT INFORMATION RELATIVE TO DIAGNOSIS, TREATMENT AND/OR COUNSELING FOR AIDS, PSYCHOLOGICAL CONDITIONS, ALCOHOL OR CONTROLLED SUBSTANCES, FOR WHICH I MUST GIVE SPECIFIC AUTHORIZATION.  A PHOTOSTAT OF THIS AUTHORIZATION SHALL BE VALID AS THE ORIGINAL.     Date 05/22/2025   [facility] Employee’s Original or  *Electronic Signature   THIS REPORT MUST BE COMPLETED AND MAILED WITHIN 3 WORKING DAYS OF TREATMENT   [facility]    Name of Facility  [place]   Date 5/22/2025 Diagnosis and Description of Injury or Occupational Disease  (S69.91XA) Injury of right thumb, initial encounter  (primary encounter diagnosis)  (S59.901A) Injury of right elbow, initial encounter  (M77.9) Tendinitis  (Y99.0) Work related injury  The primary encounter diagnosis was Injury of right thumb, initial encounter. Diagnoses of Injury of right elbow, initial encounter, Tendinitis, and Work related injury were also pertinent to this visit. Is there evidence that the injured employee was under the influence of alcohol and/or another controlled substance at the time of accident?  [x]No  [] Yes (if yes, please explain)   Hour 1:01 PM       Treatment:  Medrol dosepak  Wrist splint  Work restrictions  FU 7 days     Have you advised the patient to remain off work five days or more?      [] Yes  If yes, indicate dates: From_ _                                                      To __ _  [x] No   If no, is the injured employee capable of: [] full duty     [x] modified duty      If modified duty, specify any limitations / restrictions:_See D39 for restrictions _________________  ___ ___________________________     X-Ray Findings:  Negative     From information given by the employee, together with medical evidence, can you directly connect this injury or occupational disease as job incurred?  [x]Yes   [] No      Is additional medical care by a physician indicated? [x]Yes [] No       Do you know of any previous injury or disease contributing to this condition or occupational disease? []Yes [x] No (Explain if yes)                              Date  5/22/2025 Print Health Care Provider’s Name  GIANNA Levine. I certify that the employer’s copy of  this form was delivered to the employer on:   Address  [address] [place] [address]. [address] INSURER'S USE ONLY                       [facility] Zip  [address] Provider’s Tax ID Number  [id number]   Telephone  Dept: [phone]    Health Care Provider’s Original or Electronic Signature  GIA Roa          Degree (MD,DO, DC,[place],APRN)  APRN  Choose (if applicable)      ORIGINAL - TREATING HEALTHCARE PROVIDER PAGE 2 - INSURER/TPA PAGE 3 - EMPLOYER PAGE 4 - EMPLOYEE             Form C-4 (rev.02/25)

## 2025-05-22 NOTE — PROGRESS NOTES
"Subjective:     Chitra Higgins is a 44 y.o. female who presents for Hand Injury (WC 5/13/25, right shoulder, arm, and thumb pain)    HPI:   DOI: 5/13/25. PASQUALE: Patient reports she was at work when a swinging door slammed onto an outstretched right arm, resulting in immediate pain in the right thumb and elbow, radiating to the right anterior shoulder. Denies previous right upper extremity injury. Reports associated swelling to right thumb and tenderness to palpation. She has slightly reduced flexion of the thumb due to pain and swelling. Elbow and shoulder ROM without abnormality. Pain is exacerbated by raising her right arm. Reports intermittent tingling in the right thumb. She has attempted ibuprofen for relief.       PMH:   No pertinent past medical history to this problem  MEDS:  Medications were reviewed in EMR  ALLERGIES:  Allergies were reviewed in EMR   FH:   No pertinent family history to this problem     Objective:     /70 (BP Location: Left arm, Patient Position: Sitting, BP Cuff Size: Adult)   Pulse 75   Temp 36.4 °C (97.6 °F) (Temporal)   Resp 20   Ht 1.625 m (5' 3.98\")   Wt 78.9 kg (174 lb)   SpO2 97%   BMI 29.89 kg/m²     Physical Exam  Vitals reviewed.   Constitutional:       General: She is not in acute distress.  HENT:      Nose: Nose normal.   Eyes:      General: Vision grossly intact. Gaze aligned appropriately. No visual field deficit.     Extraocular Movements: Extraocular movements intact.      Conjunctiva/sclera: Conjunctivae normal.      Pupils: Pupils are equal, round, and reactive to light.   Cardiovascular:      Rate and Rhythm: Normal rate and regular rhythm.      Pulses: Normal pulses.           Radial pulses are 2+ on the right side and 2+ on the left side.      Heart sounds: Normal heart sounds.   Pulmonary:      Effort: Pulmonary effort is normal. No tachypnea, accessory muscle usage, prolonged expiration, respiratory distress or retractions.      Breath sounds: " Normal breath sounds. No stridor, decreased air movement or transmitted upper airway sounds. No wheezing, rhonchi or rales.   Musculoskeletal:         General: Swelling and tenderness present. No deformity.      Right shoulder: Tenderness present. No swelling, deformity, effusion, bony tenderness or crepitus. Normal range of motion. Normal strength. Normal pulse.      Left shoulder: Normal.      Right upper arm: No swelling, edema, deformity, tenderness or bony tenderness.      Left upper arm: Normal.      Right elbow: No swelling, deformity or effusion. Normal range of motion. Tenderness present.      Left elbow: Normal.      Right forearm: Tenderness present. No swelling, edema, deformity or bony tenderness.      Left forearm: Normal.      Right wrist: Tenderness present. No swelling, deformity, effusion, bony tenderness, snuff box tenderness or crepitus. Normal range of motion. Normal pulse.      Left wrist: Normal.      Right hand: Tenderness present. No swelling or deformity. Normal range of motion. Decreased strength. Normal sensation. There is no disruption of two-point discrimination. Normal capillary refill. Normal pulse.      Left hand: Normal.      Cervical back: Full passive range of motion without pain, normal range of motion and neck supple. No swelling, deformity, erythema, rigidity, spasms, tenderness, bony tenderness or crepitus. No pain with movement. Normal range of motion.      Comments: Mild swelling to right thumb. Passive ROM intact. Flexion of thumb with guarding related to pain. No subungual hematoma. Right wrist and finger extension against resistance intact. Motor, sensory, 2 point discrimination intact distal to injury. No signs of compartment syndrome or abnormality in blood flow. Positive tenderness to palpation of right elbow with associated mild swelling and ecchymosis. Right elbow ROM intact.     Skin:     General: Skin is warm and dry.      Capillary Refill: Capillary refill takes  less than 2 seconds.      Findings: Bruising present. No erythema.   Neurological:      Mental Status: She is alert. Mental status is at baseline.      Sensory: Sensation is intact.      Motor: Motor function is intact.      Coordination: Coordination is intact.      Gait: Gait is intact.   Psychiatric:         Mood and Affect: Mood normal.         Behavior: Behavior normal.         Thought Content: Thought content normal.       DX-ELBOW-COMPLETE 3+ RIGHT  Result Date: 5/22/2025 5/22/2025 1:38 PM HISTORY/REASON FOR EXAM:  Pain/Deformity Following Trauma TECHNIQUE/EXAM DESCRIPTION AND NUMBER OF VIEWS:  3 views of the RIGHT elbow. COMPARISON: None FINDINGS: No acute fracture or dislocation. No elbow joint effusion. No joint osteoarthritis.     No acute osseous abnormality.    DX-FINGER(S) 2+ RIGHT  Result Date: 5/22/2025 5/22/2025 1:38 PM HISTORY/REASON FOR EXAM:  Pain/Deformity Following Trauma TECHNIQUE/EXAM DESCRIPTION AND NUMBER OF VIEWS:  3 views of the RIGHT fingers. COMPARISON: None FINDINGS: No acute fracture or dislocation. No joint osteoarthritis.     No acute osseous abnormality.      Assessment/Plan:     1. Injury of right thumb, initial encounter  - DX-FINGER(S) 2+ RIGHT; Future    2. Injury of right elbow, initial encounter  - DX-ELBOW-COMPLETE 3+ RIGHT; Future    3. Work related injury    4. Strain of tendon of upper extremity, right, initial encounter  - methylPREDNISolone (MEDROL DOSEPAK) 4 MG Tablet Therapy Pack; Follow schedule on package instructions.  Dispense: 21 Tablet; Refill: 0    Released to RESTRICTED Duty FROM 5/22/25 TO 5/29/25.   Restrictions: No carrying, pushing, pulling, climbing, lifting, reaching above shoulder. No use of right arm/hand.   Medrol dosepak.  Wrist splint.  Work restrictions.  FU 7 days      Absence of neurovascular compromise. X-ray without evidence of acute fracture or dislocation. Patient prescribed Medrol dosepak for discomfort.     Gentle stretching exercises as  tolerated.     Illness progression and alarm symptoms discussed with patient, emphasizing low threshold for returning to clinic/emergency department for worsening symptoms. Patient is agreeable to the plan and verbalizes understanding, and will follow up if warranted.     Differential diagnosis, natural history, supportive care, and indications for immediate follow-up discussed.     Follow-up as needed if symptoms worsen or fail to improve to PCP, Urgent care or Emergency Room.                         Electronically signed by GIA Garner

## 2025-05-23 RX ORDER — METHYLPREDNISOLONE 4 MG/1
TABLET ORAL
Qty: 21 TABLET | Refills: 0 | Status: SHIPPED | OUTPATIENT
Start: 2025-05-23

## 2025-05-23 ASSESSMENT — VISUAL ACUITY: OU: 1

## 2025-05-29 ENCOUNTER — OCCUPATIONAL MEDICINE (OUTPATIENT)
Dept: URGENT CARE | Facility: PHYSICIAN GROUP | Age: 45
End: 2025-05-29
Payer: OTHER MISCELLANEOUS

## 2025-05-29 VITALS
TEMPERATURE: 97.6 F | BODY MASS INDEX: 30.48 KG/M2 | HEART RATE: 108 BPM | RESPIRATION RATE: 14 BRPM | HEIGHT: 63 IN | OXYGEN SATURATION: 97 % | SYSTOLIC BLOOD PRESSURE: 118 MMHG | DIASTOLIC BLOOD PRESSURE: 64 MMHG | WEIGHT: 172 LBS

## 2025-05-29 DIAGNOSIS — S46.911A: ICD-10-CM
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
## 2025-05-29 DIAGNOSIS — S59.901D INJURY OF RIGHT ELBOW, SUBSEQUENT ENCOUNTER: ICD-10-CM

## 2025-05-29 DIAGNOSIS — S69.91XD INJURY OF RIGHT THUMB, SUBSEQUENT ENCOUNTER: Primary | ICD-10-CM

## 2025-05-29 ASSESSMENT — ENCOUNTER SYMPTOMS
BRUISES/BLEEDS EASILY: 0
WHEEZING: 0
CONSTIPATION: 0
BLOOD IN STOOL: 0
DIARRHEA: 0
EYE REDNESS: 0
VOMITING: 0
FEVER: 0
COUGH: 0
EYE DISCHARGE: 0

## 2025-05-29 NOTE — LETTER
PHYSICIAN’S AND CHIROPRACTIC PHYSICIAN'S   PROGRESS REPORT   CERTIFICATION OF DISABILITY Claim Number:     Social Security Number: [id number]   Patient’s Name: Chitra Higgins Date of Injury: 5/13/2025   Employer:  [employer]  Name of MCO (if applicable):      Patient’s Job Description/Occupation: [employer]       Previous Injuries/Diseases/Surgeries Contributing to the Condition:         Diagnosis: (S69.91XD) Injury of right thumb, subsequent encounter  (primary encounter diagnosis)  (S59.901D) Injury of right elbow, subsequent encounter  (Y99.0) Work related injury  (S46.911A) Strain of tendon of upper extremity, right, initial encounter      Related to the Industrial Injury? Yes     Explain:        Objective Medical Findings: Overall improvement in symptoms.  Numbness and tingling has resolved.         None - Discharged                         Stable  No                 Ratable  No     X   Generally Improved                         Condition Worsened                  Condition Same  May Have Suffered a Permanent Disability No     Treatment Plan:    Continue current treatment plan, NSAIDs and Tylenol continue wrist splint at night. for pain and inflammation.  Continue exercises as described.  Continue current work restrictions and follow-up in 1 week.         No Change in Therapy                  PT/OT Prescribed                      Medication May be Used While Working        Case Management                          PT/OT Discontinued    Consultation    Further Diagnostic Studies:    Prescription(s)                 Released to FULL DUTY /No Restrictions on (Date):       Certified TOTALLY TEMPORARILY DISABLED (Indicate Dates) From:   To:    X  Released to RESTRICTED/Modified Duty on (Date): From: 5/29/2025 To: 6/5/2025  Restrictions Are:         No Sitting    No Standing    No Pulling Other:         No Bending at Waist     No Stooping X    No Lifting    X    No Carrying     No Walking Lifting Restricted to  (lbs.):  < or = to 10 pounds    X   No Pushing     X   No Climbing     No Reaching Above Shoulders       Date of Next Visit:  6/4/2025 @9AM Date of this Exam: 5/29/2025 Physician/Chiropractic Physician Name: Marcelina Arechiga MD, PhD Physician/Chiropractic Physician Signature:  Misbah Brito DO MPH     [place]:  [address] [place] [address], [address] [place], [place] [address] - Telephone [phone] [place]:  [address] [place] [address], [address] [place], [place] [address] - Telephone [phone]    [email or web address]  D-39 (Rev. 10/24)

## 2025-05-29 NOTE — LETTER
PHYSICIAN’S AND CHIROPRACTIC PHYSICIAN'S   PROGRESS REPORT   CERTIFICATION OF DISABILITY Claim Number:     Social Security Number:    Patient’s Name: Chitra Higgins Date of Injury: 5/13/2025   Employer:   Name of MCO (if applicable):      Patient’s Job Description/Occupation: [employer]       Previous Injuries/Diseases/Surgeries Contributing to the Condition:         Diagnosis: No diagnosis found.      Related to the Industrial Injury? Yes     Explain:        Objective Medical Findings: Overall improvement in symptoms.  Numbness and tingling has resolved.         None - Discharged                         Stable  No                 Ratable  No     X   Generally Improved                         Condition Worsened                  Condition Same  May Have Suffered a Permanent Disability No     Treatment Plan:    Continue current treatment plan, NSAIDs and Tylenol continue wrist splint at night. for pain and inflammation.  Continue exercises as described.  Continue current work restrictions and follow-up in 1 week.         No Change in Therapy                  PT/OT Prescribed                      Medication May be Used While Working        Case Management                          PT/OT Discontinued    Consultation    Further Diagnostic Studies:    Prescription(s)                 Released to FULL DUTY /No Restrictions on (Date):       Certified TOTALLY TEMPORARILY DISABLED (Indicate Dates) From:   To:    X  Released to RESTRICTED/Modified Duty on (Date): From: 5/29/2025 To: 6/5/2025  Restrictions Are:         No Sitting    No Standing    No Pulling Other:         No Bending at Waist     No Stooping X    No Lifting    X    No Carrying     No Walking Lifting Restricted to (lbs.):  < or = to 10 pounds    X   No Pushing     X   No Climbing     No Reaching Above Shoulders       Date of Next Visit:  6/4/2025 Date of this Exam: 5/29/2025 Physician/Chiropractic Physician Name: Marcelina Arechiga MD, PhD  Physician/Chiropractic Physician Signature:  Misbah Brito DO MPH     [place]:  [address] [place] [address], [address] [place], [place] [address] - Telephone [phone] [place]:  [address] [place] [address], [address] [place], [place] [address] - Telephone [phone]    [email or web address]  D-39 (Rev. 10/24)

## 2025-05-29 NOTE — PROGRESS NOTES
"Subjective:     Chitra Higgins is a 44 y.o. female who presents for Work-Related Injury (Fv, feeling better, medication has been helping )      Trauma  This is a recurrent problem. Pertinent negatives include no congestion, coughing, fever, rash or vomiting.       Review of Systems   Constitutional:  Negative for fever.   HENT:  Negative for congestion and ear discharge.    Eyes:  Negative for discharge and redness.   Respiratory:  Negative for cough and wheezing.    Gastrointestinal:  Negative for blood in stool, constipation, diarrhea and vomiting.   Genitourinary:  Negative for frequency and hematuria.   Musculoskeletal:  Positive for joint pain.   Skin:  Negative for itching and rash.   Endo/Heme/Allergies:  Does not bruise/bleed easily.        CURRENT MEDICATIONS:  ibuprofen Tabs  methylPREDNISolone Tbpk  nitrofurantoin Caps  ondansetron Tbdp  phenazopyridine Tabs  traMADol Tabs    Allergies:   Allergies[1]    Current Problems: Chitra Higgins does not have any pertinent problems on file.  Past Surgical Hx:    Past Surgical History:   Procedure Laterality Date    BREAST BIOPSY  10/23/2013    Performed by Franki Dodd M.D. at SURGERY SAME DAY [facility] ORS    KATHLEEN BY LAPAROSCOPY  8/25/2013    Performed by Franki Dodd M.D. at SURGERY [facility]      Past Social Hx:  reports that she has never smoked. She has never used smokeless tobacco. She reports current alcohol use. She reports that she does not use drugs.   Past Family Hx:  Chitra Higgins family history is not on file.     (Allergies, Medications, & Tobacco/Substance Use were reconciled by the Medical Assistant and reviewed by myself. The family history is prepopulated)       Objective:     /64 (BP Location: Left arm, Patient Position: Sitting, BP Cuff Size: Adult)   Pulse (!) 108   Temp 36.4 °C (97.6 °F) (Temporal)   Resp 14   Ht 1.6 m (5' 3\")   Wt 78 kg (172 lb)   SpO2 97%   BMI 30.47 kg/m²     Physical " Exam  Constitutional:       General: She is not in acute distress.     Appearance: Normal appearance. She is not ill-appearing, toxic-appearing or diaphoretic.   HENT:      Head: Normocephalic and atraumatic.      Nose: Nose normal.   Eyes:      General: No scleral icterus.        Right eye: No discharge.         Left eye: No discharge.   Cardiovascular:      Rate and Rhythm: Regular rhythm. Tachycardia present.   Pulmonary:      Effort: Pulmonary effort is normal. No respiratory distress.   Abdominal:      General: Abdomen is flat.      Palpations: Abdomen is soft.   Musculoskeletal:         General: Tenderness present. No swelling, deformity or signs of injury.      Cervical back: No rigidity.   Skin:     General: Skin is warm and dry.   Neurological:      General: No focal deficit present.      Mental Status: She is alert and oriented to person, place, and time. Mental status is at baseline.   Psychiatric:         Behavior: Behavior normal.         Judgment: Judgment normal.          Assessment/Plan:   Chitra was seen today for work-related injury.    Diagnoses and all orders for this visit:    Injury of right thumb, subsequent encounter    Injury of right elbow, subsequent encounter    Work related injury    Strain of tendon of upper extremity, right, initial encounter    Based on history of presenting illness, review of systems and physical exam findings, most likely etiology of injury is accidental trauma that is otherwise improving with current management.  Continue ice/heat, immobilization with wrist plan.  Full range of motion.  Restrictions and follow-up as work comp documented.    discussed symptomatic management with pharmacotherapy and over-the-counter medications for pain.   Discussed the risks the benefits and the indications of all new medications prescribed today.  Strict return and ED precautions were discussed and all questions were answered.     Differential diagnosis, natural history, supportive  care, and indications for immediate follow-up discussed.    Advised the patient to follow-up with the primary care physician for recheck, reevaluation, and consideration of further management.    Please note that this dictation was created using voice recognition software. I have made reasonable attempt to correct obvious errors, but I expect that there are errors of grammar and possibly content that I did not discover before finalizing the note.    This note was electronically signed by Marcelina Arechiga MD PhD         [1]   Allergies  Allergen Reactions    Pcn [Penicillins] Rash and Itching

## 2025-06-04 ENCOUNTER — OCCUPATIONAL MEDICINE (OUTPATIENT)
Dept: URGENT CARE | Facility: PHYSICIAN GROUP | Age: 45
End: 2025-06-04
Payer: COMMERCIAL

## 2025-06-04 VITALS
HEIGHT: 63 IN | SYSTOLIC BLOOD PRESSURE: 122 MMHG | DIASTOLIC BLOOD PRESSURE: 64 MMHG | BODY MASS INDEX: 31.01 KG/M2 | RESPIRATION RATE: 16 BRPM | TEMPERATURE: 96.9 F | WEIGHT: 175 LBS | HEART RATE: 65 BPM | OXYGEN SATURATION: 94 %

## 2025-06-04 DIAGNOSIS — S59.901D INJURY OF RIGHT ELBOW, SUBSEQUENT ENCOUNTER: ICD-10-CM

## 2025-06-04 DIAGNOSIS — S46.911D: ICD-10-CM

## 2025-06-04 DIAGNOSIS — S69.91XD INJURY OF RIGHT THUMB, SUBSEQUENT ENCOUNTER: Primary | ICD-10-CM

## 2025-06-04 PROCEDURE — 99213 OFFICE O/P EST LOW 20 MIN: CPT | Performed by: PHYSICIAN ASSISTANT

## 2025-06-04 PROCEDURE — 3074F SYST BP LT 130 MM HG: CPT | Performed by: PHYSICIAN ASSISTANT

## 2025-06-04 PROCEDURE — 3078F DIAST BP <80 MM HG: CPT | Performed by: PHYSICIAN ASSISTANT

## 2025-06-04 NOTE — LETTER
"PHYSICIAN’S AND CHIROPRACTIC PHYSICIAN'S   PROGRESS REPORT   CERTIFICATION OF DISABILITY Claim Number:     Social Security Number:    Patient’s Name: Chitra Higgins Date of Injury:    Employer:   Name of MCO (if applicable):      Patient’s Job Description/Occupation:        Previous Injuries/Diseases/Surgeries Contributing to the Condition:         Diagnosis: (S69.91XD) Injury of right thumb, subsequent encounter  (primary encounter diagnosis)  (S59.901D) Injury of right elbow, subsequent encounter  (S46.911D) Strain of tendon of right upper extremity, subsequent encounter      Related to the Industrial Injury? Yes     Explain: The patient presents to clinic for Workmen's Comp. follow-up.    DOI: 5/13/2025 -copied from original visit- \"PASQUALE: Patient reports she was at work when a swinging door slammed onto an outstretched right arm, resulting in immediate pain in the right thumb and elbow, radiating to the right anterior shoulder.\"    Visit #3:  Today, the patient states the injury to her right upper extremity, affecting the right thumb, right elbow, and right shoulder has improved.  The patient states she is not experiencing any pain at rest.  The patient reports slight pain/discomfort with use and movement of the right upper extremity, but states this is tolerable.  The patient reports no decreased range of motion of the right upper extremity.  She also reports no numbness, tingling, weakness.  The patient has been taking ibuprofen as directed.  She has completed the previously prescribed Medrol Dosepak.  The patient is tolerating light duty without difficulty.        Objective Medical Findings: Right Upper Extremity:  No tenderness to palpation of the right thumb, right hand, or right wrist.  No tenderness of the right forearm.  No tenderness of the right elbow.  No tenderness of the right upper arm.  No tenderness of the right shoulder.  No obvious deformity.  ROM intact -the patient " demonstrates full active range of motion of the right upper extremity  Neurovascular intact distally  Radial pulse 2+  Strength 5/5            None - Discharged                         Stable  No                 Ratable  No     X   Generally Improved                         Condition Worsened                  Condition Same  May Have Suffered a Permanent Disability No     Treatment Plan:    Plan:  Trial Full Duty without Restrictions - D39 provided   OTC NSAIDs for pain/discomfort  RICE  Monitor for worsening signs and or symptoms  Follow-up in 1 week for reassessment  --Anticipate discharge/MMI at that time  Return to clinic for any worsening and or concerning symptoms         No Change in Therapy                  PT/OT Prescribed                      Medication May be Used While Working        Case Management                          PT/OT Discontinued    Consultation    Further Diagnostic Studies:    Prescription(s)               X  Released to FULL DUTY /No Restrictions on (Date):  6/4/2025    Certified TOTALLY TEMPORARILY DISABLED (Indicate Dates) From:   To:      Released to RESTRICTED/Modified Duty on (Date): From:   To:    Restrictions Are:  Temporary      No Sitting    No Standing    No Pulling Other:         No Bending at Waist     No Stooping     No Lifting        No Carrying     No Walking Lifting Restricted to (lbs.):          No Pushing        No Climbing     No Reaching Above Shoulders       Date of Next Visit:  6/11/2025 Date of this Exam: 6/4/2025 Physician/Chiropractic Physician Name: Millie Stafford P.A.-C. Physician/Chiropractic Physician Signature:  Misbah Brito DO MPH     [place]:  [address] [place] [address], [address] [place], [place] [address] - Telephone [phone] [place]:  [address]  [facility] [place], [address] [place], [place] [address] - Telephone [phone]    [email or web address]  D-39 (Rev. 10/24)

## 2025-06-04 NOTE — PROGRESS NOTES
"Subjective     Chitra Higgins is a 44 y.o. female who presents with Work-Related Injury (DOI 5.13.25/ right hand,arm,shoulder)            HPI    The patient presents to clinic for Workmen's Comp. follow-up.    DOI: 5/13/2025 -copied from original visit- \"PASQUALE: Patient reports she was at work when a swinging door slammed onto an outstretched right arm, resulting in immediate pain in the right thumb and elbow, radiating to the right anterior shoulder.\"    Visit #3:  Today, the patient states the injury to her right upper extremity, affecting the right thumb, right elbow, and right shoulder has improved.  The patient states she is not experiencing any pain at rest.  The patient reports slight pain/discomfort with use and movement of the right upper extremity, but states this is tolerable.  The patient reports no decreased range of motion of the right upper extremity.  She also reports no numbness, tingling, weakness.  The patient has been taking ibuprofen as directed.  She has completed the previously prescribed Medrol Dosepak.  The patient is tolerating light duty without difficulty.      PMH: Reviewed in Epic, no pertinent findings.  MEDS: Reviewed in Epic.  ALLERGIES: Reviewed in Epic.  SURGHX: Reviewed in Epic.  SOCHX: Reviewed in Epic.  FH: Family history was reviewed, no pertinent findings to report.      Review of Systems   Musculoskeletal:  Negative for joint pain.              Objective     /64   Pulse 65   Temp 36.1 °C (96.9 °F) (Temporal)   Resp 16   Ht 1.6 m (5' 3\")   Wt 79.4 kg (175 lb)   SpO2 94%   BMI 31.00 kg/m²      Physical Exam  Constitutional:       General: She is not in acute distress.     Appearance: Normal appearance. She is well-developed. She is not ill-appearing.   HENT:      Head: Normocephalic and atraumatic.      Right Ear: External ear normal.      Left Ear: External ear normal.      Nose: Nose normal.   Eyes:      Extraocular Movements: Extraocular movements intact.    "   Conjunctiva/sclera: Conjunctivae normal.   Cardiovascular:      Rate and Rhythm: Normal rate.   Pulmonary:      Effort: Pulmonary effort is normal.   Musculoskeletal:      Cervical back: Normal range of motion and neck supple.      Comments:   Right Upper Extremity:  No tenderness to palpation of the right thumb, right hand, or right wrist.  No tenderness of the right forearm.  No tenderness of the right elbow.  No tenderness of the right upper arm.  No tenderness of the right shoulder.  No obvious deformity.  ROM intact -the patient demonstrates full active range of motion of the right upper extremity  Neurovascular intact distally  Radial pulse 2+  Strength 5/5   Skin:     General: Skin is warm and dry.   Neurological:      Mental Status: She is alert and oriented to person, place, and time.                              services were used throughout the duration of this encounter.        Assessment & Plan  Injury of right thumb, subsequent encounter         Injury of right elbow, subsequent encounter         Strain of tendon of right upper extremity, subsequent encounter           Differential diagnoses, supportive care, and indications for immediate follow-up discussed with patient.   Instructed to return to clinic or nearest emergency department for any change in condition, further concerns, or worsening of symptoms.     Plan:  Trial Full Duty without Restrictions - D39 provided   OTC NSAIDs for pain/discomfort  RICE  Monitor for worsening signs and or symptoms  Follow-up in 1 week for reassessment  --Anticipate discharge/MMI at that time  Return to clinic for any worsening and or concerning symptoms    Discussed plan with the patient, and she agrees to the above.     I personally reviewed prior external notes and test results pertinent to today's visit.  I have independently reviewed and interpreted all diagnostics ordered during this urgent care visit.     Please note that this dictation was  created using voice recognition software. I have made every reasonable attempt to correct obvious errors, but I expect that there may be errors of grammar and possibly content that I did not discover before finalizing the note.     This note was electronically signed by Millie Stafford PA-C

## 2025-06-11 ENCOUNTER — OCCUPATIONAL MEDICINE (OUTPATIENT)
Dept: URGENT CARE | Facility: PHYSICIAN GROUP | Age: 45
End: 2025-06-11
Payer: COMMERCIAL

## 2025-06-11 VITALS
OXYGEN SATURATION: 98 % | HEIGHT: 63 IN | BODY MASS INDEX: 31.01 KG/M2 | DIASTOLIC BLOOD PRESSURE: 80 MMHG | HEART RATE: 93 BPM | WEIGHT: 175 LBS | SYSTOLIC BLOOD PRESSURE: 120 MMHG | RESPIRATION RATE: 14 BRPM | TEMPERATURE: 98.1 F

## 2025-06-11 DIAGNOSIS — S59.901D INJURY OF RIGHT ELBOW, SUBSEQUENT ENCOUNTER: ICD-10-CM

## 2025-06-11 DIAGNOSIS — S69.91XD INJURY OF RIGHT THUMB, SUBSEQUENT ENCOUNTER: Primary | ICD-10-CM

## 2025-06-11 DIAGNOSIS — S46.911D: ICD-10-CM

## 2025-06-11 PROCEDURE — 3074F SYST BP LT 130 MM HG: CPT

## 2025-06-11 PROCEDURE — 3079F DIAST BP 80-89 MM HG: CPT

## 2025-06-11 PROCEDURE — 99214 OFFICE O/P EST MOD 30 MIN: CPT

## 2025-06-11 NOTE — LETTER
PHYSICIAN’S AND CHIROPRACTIC PHYSICIAN'S   PROGRESS REPORT   CERTIFICATION OF DISABILITY Claim Number:     Social Security Number:    Patient’s Name: Chitra Higgins Date of Injury: 5/13/2025   Employer:  [employer] Name of [employer] (if applicable):      Patient’s Job Description/Occupation: [employer]       Previous Injuries/Diseases/Surgeries Contributing to the Condition:   denies       Diagnosis: (S69.91XD) Injury of right thumb, subsequent encounter  (primary encounter diagnosis)  (S59.901D) Injury of right elbow, subsequent encounter  (S46.911D) Strain of tendon of right upper extremity, subsequent encounter      Related to the Industrial Injury? Yes     Explain:        Objective Medical Findings: Reports full/100% improvement. No tenderness over right arm, elbow, and thumb. No swelling. FROM throughout, 5/5 strength, +SILT. Distal neurovascular intact.          None - Discharged                         Stable  Yes                 Ratable  No     X   Generally Improved                         Condition Worsened                  Condition Same  May Have Suffered a Permanent Disability No     Treatment Plan:    Released to full duty         No Change in Therapy                  PT/OT Prescribed                      Medication May be Used While Working        Case Management                          PT/OT Discontinued    Consultation    Further Diagnostic Studies:    Prescription(s)               X  Released to FULL DUTY /No Restrictions on (Date):       Certified TOTALLY TEMPORARILY DISABLED (Indicate Dates) From:   To:      Released to RESTRICTED/Modified Duty on (Date): From:   To:    Restrictions Are:         No Sitting    No Standing    No Pulling Other:         No Bending at Waist     No Stooping     No Lifting        No Carrying     No Walking Lifting Restricted to (lbs.):          No Pushing        No Climbing     No Reaching Above Shoulders       Date of Next Visit:    Date of this Exam:  6/11/2025 Physician/Chiropractic Physician Name: SLADE Grewal Physician/Chiropractic Physician Signature:  Misbah Brito DO [facility]:  [facility]  [facility], [address] [place], [place] [address] - Telephone [phone] [place]:  [address] [place] [address], [address] [place], [place] [address] - Telephone [phone]    [email or web address]  D-39 (Rev. 10/24)

## 2025-06-11 NOTE — PROGRESS NOTES
"Subjective:   Chitra Higgins is a 44 y.o. female who presents for Work-Related Injury (DOI 5/13/25, Pt states still experiencing pain )      Date of Injury: 5/13/2025     4th visit - Reports 100% improvement. Tolerating full duty trial. Denies pain, weakness, numbness or tingling. Would like to be released to full duty.     PMH:   Reviewed, see above  MEDS:  Medications were reviewed in EMR  ALLERGIES:  Allergies were reviewed in EMR  [employer]:  Works as a [employer]   FH:   No pertinent family history to this problem     Objective:   /80 (BP Location: Left arm, Patient Position: Sitting, BP Cuff Size: Adult)   Pulse 93   Temp 36.7 °C (98.1 °F) (Skin)   Resp 14   Ht 1.6 m (5' 3\")   Wt 79.4 kg (175 lb)   SpO2 98%   BMI 31.00 kg/m²     Reports full/100% improvement. No tenderness over right arm, elbow, and thumb. No swelling. FROM throughout, 5/5 strength, +SILT. Distal neurovascular intact.     Assessment/Plan:     1. Injury of right thumb, subsequent encounter    2. Injury of right elbow, subsequent encounter    3. Strain of tendon of right upper extremity, subsequent encounter      Released to Full Duty - discharged M.M.I. (Maximally Medically Improved)    Differential diagnosis, natural history, supportive care, and indications for immediate follow-up discussed.    Aleksandra Carr DNP, APRN, FNP-BC    "